# Patient Record
Sex: MALE | Race: WHITE | NOT HISPANIC OR LATINO | Employment: FULL TIME | URBAN - METROPOLITAN AREA
[De-identification: names, ages, dates, MRNs, and addresses within clinical notes are randomized per-mention and may not be internally consistent; named-entity substitution may affect disease eponyms.]

---

## 2018-05-21 ENCOUNTER — OFFICE VISIT (OUTPATIENT)
Dept: ORTHOPEDIC SURGERY | Facility: CLINIC | Age: 48
End: 2018-05-21

## 2018-05-21 VITALS
WEIGHT: 257.28 LBS | DIASTOLIC BLOOD PRESSURE: 103 MMHG | HEIGHT: 74 IN | BODY MASS INDEX: 33.02 KG/M2 | SYSTOLIC BLOOD PRESSURE: 169 MMHG | HEART RATE: 55 BPM

## 2018-05-21 DIAGNOSIS — M17.11 PRIMARY OSTEOARTHRITIS OF RIGHT KNEE: Primary | ICD-10-CM

## 2018-05-21 DIAGNOSIS — Z96.642 STATUS POST TOTAL REPLACEMENT OF LEFT HIP: ICD-10-CM

## 2018-05-21 PROCEDURE — 20610 DRAIN/INJ JOINT/BURSA W/O US: CPT | Performed by: ORTHOPAEDIC SURGERY

## 2018-05-21 PROCEDURE — 99214 OFFICE O/P EST MOD 30 MIN: CPT | Performed by: ORTHOPAEDIC SURGERY

## 2018-05-21 RX ORDER — TRIAMCINOLONE ACETONIDE 40 MG/ML
40 INJECTION, SUSPENSION INTRA-ARTICULAR; INTRAMUSCULAR
Status: COMPLETED | OUTPATIENT
Start: 2018-05-21 | End: 2018-05-21

## 2018-05-21 RX ORDER — ROPIVACAINE HYDROCHLORIDE 5 MG/ML
4 INJECTION, SOLUTION EPIDURAL; INFILTRATION; PERINEURAL
Status: COMPLETED | OUTPATIENT
Start: 2018-05-21 | End: 2018-05-21

## 2018-05-21 RX ADMIN — ROPIVACAINE HYDROCHLORIDE 4 ML: 5 INJECTION, SOLUTION EPIDURAL; INFILTRATION; PERINEURAL at 09:41

## 2018-05-21 RX ADMIN — TRIAMCINOLONE ACETONIDE 40 MG: 40 INJECTION, SUSPENSION INTRA-ARTICULAR; INTRAMUSCULAR at 09:41

## 2018-05-21 NOTE — PROGRESS NOTES
[facility] [place] [facility]opa[facility] Note    Subjective     Chief Complaint   Patient presents with   • Right Knee - Follow-up, Pain     (R) Knee Arthroscopy with Partial Medial and Lateral Meniscectomies 01/05/2017   • Left Hip - Follow-up     Left total hip arthroplasty, 2012        HPI    Silverio Ford is a 47 y.o. male. He presents today for evaluation of right knee pain.  He's had persistent pain in the right knee, which is mild to moderate, sharp in quality, associated with swelling, grinding and stiffness.  It worsens with walking, standing and sitting.  Known history of arthritis in the knee.  Previous arthroscopy with no significant relief.    Also, he would like to have his left hip followed up on.  He is now over 5 years out from his total hip arthroplasty, and has had 80% relief compared to his preoperative symptoms.  He does experience groin pain with certain activities.  No fevers, chills or night sweats.    There is no problem list on file for this patient.    No past medical history on file.   No past surgical history on file.   No family history on file.  Social History     Social History   • Marital status: Unknown     Spouse name: N/A   • Number of children: N/A   • Years of education: N/A     Occupational History   • Not on file.     Social History Main Topics   • Smoking status: Not on file   • Smokeless tobacco: Not on file   • Alcohol use Not on file   • Drug use: Unknown   • Sexual activity: Not on file     Other Topics Concern   • Not on file     Social History Narrative   • No narrative on file      No current outpatient prescriptions on file prior to visit.     No current facility-administered medications on file prior to visit.       No Known Allergies     Review of Systems   Constitutional: Negative for activity change, appetite change, chills, diaphoresis, fatigue, fever and unexpected weight change.   HENT: Negative for congestion, dental problem, drooling, ear discharge, ear pain,  "facial swelling, hearing loss, mouth sores, nosebleeds, postnasal drip, rhinorrhea, sinus pressure, sneezing, sore throat, tinnitus, trouble swallowing and voice change.    Eyes: Negative for photophobia, pain, discharge, redness, itching and visual disturbance.   Respiratory: Negative for apnea, cough, choking, chest tightness, shortness of breath, wheezing and stridor.    Cardiovascular: Negative for chest pain, palpitations and leg swelling.   Gastrointestinal: Negative for abdominal distention, abdominal pain, anal bleeding, blood in stool, constipation, diarrhea, nausea, rectal pain and vomiting.   Endocrine: Negative for cold intolerance, heat intolerance, polydipsia, polyphagia and polyuria.   Genitourinary: Negative for decreased urine volume, difficulty urinating, dysuria, enuresis, flank pain, frequency, genital sores, hematuria and urgency.   Musculoskeletal: Positive for joint swelling. Negative for arthralgias, back pain, gait problem, myalgias, neck pain and neck stiffness.        Joint Pain    Skin: Negative for color change, pallor, rash and wound.   Allergic/Immunologic: Negative for environmental allergies, food allergies and immunocompromised state.   Neurological: Negative for dizziness, tremors, seizures, syncope, facial asymmetry, speech difficulty, weakness, light-headedness, numbness and headaches.   Hematological: Negative for adenopathy. Does not bruise/bleed easily.   Psychiatric/Behavioral: Negative for agitation, behavioral problems, confusion, decreased concentration, dysphoric mood, hallucinations, self-injury, sleep disturbance and suicidal ideas. The patient is not nervous/anxious and is not hyperactive.         Objective      Physical Exam  BP (!) 169/103   Pulse 55   Ht 189 cm (74.41\")   Wt 117 kg (257 lb 4.4 oz)   BMI 32.67 kg/m²     Body mass index is 32.67 kg/m².    General:   Mental Status:  Alert   Appearance: Cooperative, in no acute distress   Build and Nutrition: " Well-nourished and well developed male   Orientation: Alert and oriented to person, place and time   Posture: Normal   Gait: Normal    Integument:   Left hip: Wound is well-healed with no signs of infection    Lower Extremity:   Left Hip:    Tenderness:  None    Swelling:  None    Crepitus:  None    Range of motion:  External Rotation: 30°       Internal Rotation: 30°       Flexion:  100°       Extension:  0°    Deformities:  None  Functional testing: Positive Stinchfield (mild)    No leg length discrepancy    Integument:   Right knee: No skin lesions, no rash, no ecchymosis    Lower Extremities:   Right Knee:    Tenderness:  No medial or lateral joint line tenderness    Effusion:  None    Swelling: None    Crepitus:  Positive    Range of motion:  Extension: 0°       Flexion: 120°  Instability:  No varus laxity, no valgus laxity, negative anterior drawer  Deformities:  Varus        Imaging/Studies  Imaging Results (last 24 hours)     Procedure Component Value Units Date/Time    XR Pelvis 1 or 2 View [[id number]] Resulted:  05/21/18 0952     Updated:  05/21/18 0953    Narrative:       Left Hip Radiographs  Indication: status-post left total hip arthroplasty  Views: low AP pelvis and lateral of the left hip    Comparison: no change compared to prior study, 11/6/2012    Findings:   The components are well aligned, with no signs of loosening or failure.    Mild amount of heterotopic bone is seen, around the greater trochanter,   certainly nonbridging.    XR Knee 4+ View Right [[id number]] Resulted:  05/21/18 0926     Updated:  05/21/18 0927    Narrative:       Right Knee Radiographs  Indication: right knee pain  Views: Standing AP's and skiers of both knees, with lateral and sunrise   views of the right knee    Comparison: no prior studies available    Findings:   Arthritic changes are seen, with varus alignment, medial joint space   narrowing, tricompartmental osteophytes, and bone-on-bone contact in the   patellofemoral  joint.            Assessment and Plan     Silverio was seen today for follow-up, pain and follow-up.    Diagnoses and all orders for this visit:    Primary osteoarthritis of right knee  -     XR Knee 4+ View Right  -     Large Joint Arthrocentesis    Status post total replacement of left hip  -     XR Pelvis 1 or 2 View          I reviewed my findings with patient today.  Right knee continues to bother him, and he would like to have an injection today.  Injection did provide 100% relief just a few minutes following the injection.  Long-term, he is candidate for knee replacement surgery.    With regards to his left hip, I see no unusual features radiographically.  Overall he is improved compared to his preoperative symptoms.  If he continues to have pain in the hip, we could work it up further with screening labs on his next visit.    Return in about 3 months (around 8/21/2018).      Medical Decision Making  Management Options : prescription/IM medicine  Data/Risk: radiology tests and independent visualization of imaging, lab tests, or EMG/NCV      Jarek Reid MD  05/21/18  10:08 AM

## 2018-05-21 NOTE — PROGRESS NOTES
Procedure   Large Joint Injection  Date/Time: 5/21/2018 9:41 AM  Consent given by: patient  Site marked: site marked  Timeout: Immediately prior to procedure a time out was called to verify the correct patient, procedure, equipment, support staff and site/side marked as required   Supporting Documentation  Indications: pain   Procedure Details  Location: knee - R knee  Preparation: Patient was prepped and draped in the usual sterile fashion  Needle size: 22 G  Approach: anterolateral  Medications administered: 4 mL ropivacaine 0.5 %; 40 mg triamcinolone acetonide 40 MG/ML  Patient tolerance: patient tolerated the procedure well with no immediate complications

## 2019-03-20 ENCOUNTER — OFFICE VISIT (OUTPATIENT)
Dept: ORTHOPEDIC SURGERY | Facility: CLINIC | Age: 49
End: 2019-03-20

## 2019-03-20 VITALS — BODY MASS INDEX: 34.8 KG/M2 | HEIGHT: 74 IN | WEIGHT: 271.17 LBS | OXYGEN SATURATION: 98 % | HEART RATE: 68 BPM

## 2019-03-20 DIAGNOSIS — M17.11 PRIMARY OSTEOARTHRITIS OF RIGHT KNEE: Primary | ICD-10-CM

## 2019-03-20 PROCEDURE — 20610 DRAIN/INJ JOINT/BURSA W/O US: CPT | Performed by: ORTHOPAEDIC SURGERY

## 2019-03-20 RX ORDER — TRIAMCINOLONE ACETONIDE 40 MG/ML
40 INJECTION, SUSPENSION INTRA-ARTICULAR; INTRAMUSCULAR
Status: COMPLETED | OUTPATIENT
Start: 2019-03-20 | End: 2019-03-20

## 2019-03-20 RX ORDER — ROPIVACAINE HYDROCHLORIDE 5 MG/ML
4 INJECTION, SOLUTION EPIDURAL; INFILTRATION; PERINEURAL
Status: COMPLETED | OUTPATIENT
Start: 2019-03-20 | End: 2019-03-20

## 2019-03-20 RX ADMIN — TRIAMCINOLONE ACETONIDE 40 MG: 40 INJECTION, SUSPENSION INTRA-ARTICULAR; INTRAMUSCULAR at 12:16

## 2019-03-20 RX ADMIN — ROPIVACAINE HYDROCHLORIDE 4 ML: 5 INJECTION, SOLUTION EPIDURAL; INFILTRATION; PERINEURAL at 12:16

## 2019-03-20 NOTE — PROGRESS NOTES
Procedure   Large Joint Arthrocentesis: R knee  Date/Time: 3/20/2019 12:16 PM  Consent given by: patient  Site marked: site marked  Timeout: Immediately prior to procedure a time out was called to verify the correct patient, procedure, equipment, support staff and site/side marked as required   Supporting Documentation  Indications: pain   Procedure Details  Location: knee - R knee  Preparation: Patient was prepped and draped in the usual sterile fashion  Needle size: 22 G  Approach: anterolateral  Medications administered: 4 mL ropivacaine 0.5 %; 40 mg triamcinolone acetonide 40 MG/ML  Patient tolerance: patient tolerated the procedure well with no immediate complications

## 2019-03-20 NOTE — PROGRESS NOTES
[facility] [place] [facility] Note    Subjective     Chief Complaint   Patient presents with   • Right Knee - Follow-up     10 months- Primary osteoarthritis of right knee         HPI    Silverio Ford is a 48 y.o. male.  He presents today for evaluation of right knee pain.  The pain is been present for 2 years, following no injury.  He had an injection last May, which did provide good relief, and he would like to have another injection today.  The pain is worse with walking, standing, and sitting.  It is associated with swelling, stiffness and giving way.  The pain is currently 6 out of 10.  It is stabbing in nature.      There is no problem list on file for this patient.    History reviewed. No pertinent past medical history.   Past Surgical History:   Procedure Laterality Date   • JOINT REPLACEMENT Left     2012   • KNEE ARTHROSCOPY Right     malcom   • KNEE ARTHROSCOPY Left     2001      Family History   Problem Relation Age of Onset   • No Known Problems Mother    • No Known Problems Father      Social History     Socioeconomic History   • Marital status: Unknown     Spouse name: Not on file   • Number of children: Not on file   • Years of education: Not on file   • Highest education level: Not on file   Tobacco Use   • Smoking status: Never Smoker   • Smokeless tobacco: Never Used   Substance and Sexual Activity   • Alcohol use: Yes     Comment: occ   • Drug use: No   • Sexual activity: Defer      No current outpatient medications on file prior to visit.     No current facility-administered medications on file prior to visit.       No Known Allergies     Review of Systems   Constitutional: Negative for activity change, appetite change, chills, diaphoresis, fatigue, fever and unexpected weight change.   HENT: Negative for congestion, dental problem, drooling, ear discharge, ear pain, facial swelling, hearing loss, mouth sores, nosebleeds, postnasal drip, rhinorrhea, sinus pressure, sneezing, sore throat,  "tinnitus, trouble swallowing and voice change.    Eyes: Negative for photophobia, pain, discharge, redness, itching and visual disturbance.   Respiratory: Negative for apnea, cough, choking, chest tightness, shortness of breath, wheezing and stridor.    Cardiovascular: Negative for chest pain, palpitations and leg swelling.   Gastrointestinal: Negative for abdominal distention, abdominal pain, anal bleeding, blood in stool, constipation, diarrhea, nausea, rectal pain and vomiting.   Endocrine: Negative for cold intolerance, heat intolerance, polydipsia, polyphagia and polyuria.   Genitourinary: Negative for decreased urine volume, difficulty urinating, dysuria, enuresis, flank pain, frequency, genital sores, hematuria and urgency.   Musculoskeletal: Positive for arthralgias and joint swelling. Negative for back pain, gait problem, myalgias, neck pain and neck stiffness.   Skin: Negative for color change, pallor, rash and wound.   Allergic/Immunologic: Negative for environmental allergies, food allergies and immunocompromised state.   Neurological: Negative for dizziness, tremors, seizures, syncope, facial asymmetry, speech difficulty, weakness, light-headedness, numbness and headaches.   Hematological: Negative for adenopathy. Does not bruise/bleed easily.   Psychiatric/Behavioral: Negative for agitation, behavioral problems, confusion, decreased concentration, dysphoric mood, hallucinations, self-injury, sleep disturbance and suicidal ideas. The patient is not nervous/anxious and is not hyperactive.         Objective      Physical Exam  Pulse 68   Ht 189 cm (74.41\")   Wt 123 kg (271 lb 2.7 oz)   SpO2 98%   BMI 34.43 kg/m²     Body mass index is 34.43 kg/m².    General:   Mental Status:  Alert   Appearance: Cooperative, in no acute distress   Build and Nutrition: Well-nourished well-developed male   Orientation: Alert and oriented to person, place and time   Posture: Normal   Gait: Normal    Integument:   Right " knee: No skin lesions, no rash, no ecchymosis    Lower Extremities:   Right Knee:    Tenderness:  None    Effusion:  None    Swelling: None    Crepitus:  Positive    Range of motion:  Extension: 0°       Flexion: 120°  Instability:  No varus laxity, no valgus laxity, negative anterior drawer  Deformities:  None          Assessment and Plan     Silverio was seen today for follow-up.    Diagnoses and all orders for this visit:    Primary osteoarthritis of right knee  -     Large Joint Arthrocentesis: R knee        1. Primary osteoarthritis of right knee        I reviewed my findings with the patient today.  He would like to have his right knee injected, and this was provided.  He had 80% relief just a few minutes on the injection.  I will see him back in 4 months, but sooner for any problems.    Return in about 4 months (around 7/20/2019).      Medical Decision Making  Management Options : prescription/IM medicine      Jarek Reid MD  03/20/19  5:32 PM

## 2020-06-19 DIAGNOSIS — Z12.11 SCREENING FOR COLON CANCER: Primary | ICD-10-CM

## 2020-06-26 ENCOUNTER — APPOINTMENT (OUTPATIENT)
Dept: PREADMISSION TESTING | Facility: HOSPITAL | Age: 50
End: 2020-06-26

## 2020-06-26 LAB
REF LAB TEST METHOD: NORMAL
SARS-COV-2 RNA RESP QL NAA+PROBE: NOT DETECTED

## 2020-06-26 PROCEDURE — U0004 COV-19 TEST NON-CDC HGH THRU: HCPCS

## 2020-06-26 PROCEDURE — C9803 HOPD COVID-19 SPEC COLLECT: HCPCS

## 2020-06-26 PROCEDURE — U0002 COVID-19 LAB TEST NON-CDC: HCPCS

## 2020-06-29 ENCOUNTER — OUTSIDE FACILITY SERVICE (OUTPATIENT)
Dept: GASTROENTEROLOGY | Facility: CLINIC | Age: 50
End: 2020-06-29

## 2020-06-29 ENCOUNTER — LAB REQUISITION (OUTPATIENT)
Dept: LAB | Facility: HOSPITAL | Age: 50
End: 2020-06-29

## 2020-06-29 DIAGNOSIS — Z12.11 ENCOUNTER FOR SCREENING FOR MALIGNANT NEOPLASM OF COLON: ICD-10-CM

## 2020-06-29 PROCEDURE — 45380 COLONOSCOPY AND BIOPSY: CPT | Performed by: INTERNAL MEDICINE

## 2020-06-29 PROCEDURE — 88305 TISSUE EXAM BY PATHOLOGIST: CPT | Performed by: INTERNAL MEDICINE

## 2020-06-30 LAB
CYTO UR: NORMAL
LAB AP CASE REPORT: NORMAL
LAB AP CLINICAL INFORMATION: NORMAL
PATH REPORT.FINAL DX SPEC: NORMAL
PATH REPORT.GROSS SPEC: NORMAL

## 2020-12-16 ENCOUNTER — WEB ENCOUNTER (OUTPATIENT)
Dept: URBAN - METROPOLITAN AREA CLINIC 82 | Facility: CLINIC | Age: 50
End: 2020-12-16

## 2020-12-18 ENCOUNTER — OFFICE VISIT (OUTPATIENT)
Dept: URBAN - METROPOLITAN AREA SURGERY CENTER 13 | Facility: SURGERY CENTER | Age: 50
End: 2020-12-18
Payer: COMMERCIAL

## 2020-12-18 DIAGNOSIS — Z12.11 COLON CANCER SCREENING: ICD-10-CM

## 2020-12-18 PROCEDURE — G8907 PT DOC NO EVENTS ON DISCHARG: HCPCS | Performed by: INTERNAL MEDICINE

## 2020-12-18 PROCEDURE — G0121 COLON CA SCRN NOT HI RSK IND: HCPCS | Performed by: INTERNAL MEDICINE

## 2020-12-18 PROCEDURE — G9935 CANC NOT DETECTD DURING SRCN: HCPCS | Performed by: INTERNAL MEDICINE

## 2023-09-06 ENCOUNTER — OFFICE VISIT (OUTPATIENT)
Dept: ORTHOPEDIC SURGERY | Facility: CLINIC | Age: 53
End: 2023-09-06
Payer: COMMERCIAL

## 2023-09-06 VITALS
BODY MASS INDEX: 34.24 KG/M2 | DIASTOLIC BLOOD PRESSURE: 90 MMHG | WEIGHT: 266.8 LBS | HEIGHT: 74 IN | SYSTOLIC BLOOD PRESSURE: 140 MMHG

## 2023-09-06 DIAGNOSIS — M17.11 PRIMARY OSTEOARTHRITIS OF RIGHT KNEE: Primary | ICD-10-CM

## 2023-09-06 RX ORDER — TRIAMCINOLONE ACETONIDE 40 MG/ML
40 INJECTION, SUSPENSION INTRA-ARTICULAR; INTRAMUSCULAR
Status: COMPLETED | OUTPATIENT
Start: 2023-09-06 | End: 2023-09-06

## 2023-09-06 RX ORDER — MELATONIN
2000 DAILY
COMMUNITY

## 2023-09-06 RX ORDER — MULTIPLE VITAMINS W/ MINERALS TAB 9MG-400MCG
1 TAB ORAL DAILY
COMMUNITY

## 2023-09-06 RX ORDER — ROPIVACAINE HYDROCHLORIDE 5 MG/ML
4 INJECTION, SOLUTION EPIDURAL; INFILTRATION; PERINEURAL
Status: COMPLETED | OUTPATIENT
Start: 2023-09-06 | End: 2023-09-06

## 2023-09-06 RX ADMIN — ROPIVACAINE HYDROCHLORIDE 4 ML: 5 INJECTION, SOLUTION EPIDURAL; INFILTRATION; PERINEURAL at 10:01

## 2023-09-06 RX ADMIN — TRIAMCINOLONE ACETONIDE 40 MG: 40 INJECTION, SUSPENSION INTRA-ARTICULAR; INTRAMUSCULAR at 10:01

## 2023-09-06 NOTE — PROGRESS NOTES
Procedure   - Large Joint Arthrocentesis: R knee on 9/6/2023 10:01 AM  Indications: pain  Details: 21 G needle, anterolateral approach  Medications: 4 mL ropivacaine 0.5 %; 40 mg triamcinolone acetonide 40 MG/ML  Outcome: tolerated well, no immediate complications  Consent was given by the patient. Immediately prior to procedure a time out was called to verify the correct patient, procedure, equipment, support staff and site/side marked as required. Patient was prepped and draped in the usual sterile fashion.

## 2023-09-06 NOTE — PROGRESS NOTES
[facility] [place] Orthopa[facility] Surgery Clinic Note    Subjective     Chief Complaint   Patient presents with    Right Knee - Pain        HPI    Silverio Ford is a 53 y.o. male who presents with new problem of: right knee pain.  Onset: atraumatic and gradual in nature. The issue has been ongoing for 6 year(s). Pain is a 5/10 on the pain scale. Pain is described as aching, stabbing, and shooting. Associated symptoms include pain, swelling, popping, and giving way/buckling. The pain is worse with walking, standing, sitting, climbing stairs, and sleeping; ice and rubbing it/moving it improves the pain. Previous treatments have included: steroid injection. Arthroscopy - 01/2017  (last injection 3/20/2019).  His right hip does bother him off and on also, but the knee seems to be the primary issue currently.    I have reviewed the following portions of the patient's history and agree with: History of Present Illness and Review of Systems    There is no problem list on file for this patient.    No past medical history on file.   Past Surgical History:   Procedure Laterality Date    JOINT REPLACEMENT Left     2012    KNEE ARTHROSCOPY Right     malcom    KNEE ARTHROSCOPY Left     2001      Family History   Problem Relation Age of Onset    No Known Problems Mother     No Known Problems Father      Social History     Socioeconomic History    Marital status:    Tobacco Use    Smoking status: Never    Smokeless tobacco: Never   Substance and Sexual Activity    Alcohol use: Yes     Comment: occ    Drug use: No    Sexual activity: Yes     Partners: Female      Current Outpatient Medications on File Prior to Visit   Medication Sig Dispense Refill    Cholecalciferol 25 MCG (1000 UT) tablet Take 2 tablets by mouth Daily.      multivitamin with minerals tablet tablet Take 1 tablet by mouth Daily.      Omega-3 Fatty Acids (OMEGA-3 CF PO) Take  by mouth.      [DISCONTINUED] Sod Picosulfate-Mag Ox-Cit Acd 10-3.5-12 MG-GM -GM/160ML solution Take  1 kit by mouth Take As Directed. Follow instructions that were mailed to your home. If you didn't receive these call [phone]. 2 bottle 0     No current facility-administered medications on file prior to visit.      Allergies   Allergen Reactions    Lovenox [Enoxaparin] Rash        Review of Systems   Constitutional:  Negative for activity change, appetite change, chills, diaphoresis, fatigue, fever and unexpected weight change.   HENT:  Negative for congestion, dental problem, drooling, ear discharge, ear pain, facial swelling, hearing loss, mouth sores, nosebleeds, postnasal drip, rhinorrhea, sinus pressure, sneezing, sore throat, tinnitus, trouble swallowing and voice change.    Eyes:  Negative for photophobia, pain, discharge, redness, itching and visual disturbance.   Respiratory:  Negative for apnea, cough, choking, chest tightness, shortness of breath, wheezing and stridor.    Cardiovascular:  Negative for chest pain, palpitations and leg swelling.   Gastrointestinal:  Negative for abdominal distention, abdominal pain, anal bleeding, blood in stool, constipation, diarrhea, nausea, rectal pain and vomiting.   Endocrine: Negative for cold intolerance, heat intolerance, polydipsia, polyphagia and polyuria.   Genitourinary:  Negative for decreased urine volume, difficulty urinating, dysuria, enuresis, flank pain, frequency, genital sores, hematuria and urgency.   Musculoskeletal:  Positive for arthralgias. Negative for back pain, gait problem, joint swelling, myalgias, neck pain and neck stiffness.   Skin:  Negative for color change, pallor, rash and wound.   Allergic/Immunologic: Negative for environmental allergies, food allergies and immunocompromised state.   Neurological:  Negative for dizziness, tremors, seizures, syncope, facial asymmetry, speech difficulty, weakness, light-headedness, numbness and headaches.   Hematological:  Negative for adenopathy. Does not bruise/bleed easily.  "  Psychiatric/Behavioral:  Negative for agitation, behavioral problems, confusion, decreased concentration, dysphoric mood, hallucinations, self-injury, sleep disturbance and suicidal ideas. The patient is not nervous/anxious and is not hyperactive.       Objective      Physical Exam  /90   Ht 188 cm (74\")   Wt 121 kg (266 lb 12.8 oz)   BMI 34.26 kg/m²     Body mass index is 34.26 kg/m².    General:   Mental Status:  Alert   Appearance: Cooperative, in no acute distress   Build and Nutrition: Well-nourished well-developed male   Orientation: Alert and oriented to person, place and time   Posture: Normal   Gait: Nonantalgic    Integument:   Right knee: No skin lesions, no rash, no ecchymosis    Lower Extremities:   Right Knee:    Tenderness:  Medial joint line tenderness    Effusion:  1+    Swelling: None    Crepitus:  Positive    Range of motion:  Extension: 0°       Flexion: 120°  Instability:  No varus laxity, no valgus laxity, negative anterior drawer  Deformities:  Varus      Imaging/Studies      Imaging Results (Last 24 Hours)       Procedure Component Value Units Date/Time    XR Knee 4+ View Right [[id number]] Resulted: 09/06/23 0946     Updated: 09/06/23 0946    Narrative:      Right Knee Radiographs  Indication: right knee pain  Views: Standing AP's and skiers of both knees, with lateral and sunrise   views of the right knee    Comparison: 5/21/2018    Findings:   Near bone-on-bone contact medial compartment, tricompartmental   osteophytes, peaking of the tibial spines, varus alignment, no acute bony   abnormalities.  Mild worsening overall compared to the previous imaging.              Assessment and Plan     Diagnoses and all orders for this visit:    1. Primary osteoarthritis of right knee (Primary)  -     XR Knee 4+ View Right  -     - Large Joint Arthrocentesis: R knee        1. Primary osteoarthritis of right knee        I reviewed my findings with the patient.  Discussed options for his knee " today, and he would like to proceed with an intra-articular knee injection.  If he has no significant improvement, his hip is likely contributing to the pain.  I will see him back in 3 months, but sooner for any problems.  Ultimately he is a candidate for knee replacement surgery in the future.    Procedure Note:  The potential benefits of performing a therapeutic right knee joint injection, as well as potential risks (including, but not limited to infection, swelling, pain, bleeding, bruising, nerve/blood vessel damage, skin color changes, transient elevation in blood glucose levels, and fat atrophy) were discussed with the patient.  After informed consent, timeout procedure was performed, and the skin on the right knee was prepped with chlorhexidine soap and alcohol, after which ethyl chloride was applied to the skin at the injection site. Via the anterolateral approach, 1ml of Kenalog 40mg/ml mixed with 4ml 0.5% ropivacaine plain was injected into the knee joint.  The patient tolerated the procedure well, experiencing 100% improvement a few minutes following the injection. There were no complications.  Band-Aid was applied to the injection site. Post-procedural instructions were given to the patient and/or their caregiver.      Return in about 3 months (around 12/6/2023).      Jarek Reid MD  09/06/23  10:09 EDT

## 2024-04-17 ENCOUNTER — OFFICE VISIT (OUTPATIENT)
Dept: ORTHOPEDIC SURGERY | Facility: CLINIC | Age: 54
End: 2024-04-17
Payer: COMMERCIAL

## 2024-04-17 VITALS
BODY MASS INDEX: 33.62 KG/M2 | WEIGHT: 262 LBS | HEIGHT: 74 IN | DIASTOLIC BLOOD PRESSURE: 100 MMHG | SYSTOLIC BLOOD PRESSURE: 150 MMHG

## 2024-04-17 DIAGNOSIS — M17.11 PRIMARY OSTEOARTHRITIS OF RIGHT KNEE: Primary | ICD-10-CM

## 2024-04-17 PROCEDURE — 99214 OFFICE O/P EST MOD 30 MIN: CPT | Performed by: ORTHOPAEDIC SURGERY

## 2024-04-17 RX ORDER — ACETAMINOPHEN 325 MG/1
1000 TABLET ORAL ONCE
OUTPATIENT
Start: 2024-04-17 | End: 2024-04-17

## 2024-04-17 RX ORDER — CHLORHEXIDINE GLUCONATE 40 MG/ML
SOLUTION TOPICAL DAILY
Qty: 237 ML | Refills: 0 | Status: SHIPPED | OUTPATIENT
Start: 2024-04-17

## 2024-04-17 RX ORDER — MELOXICAM 7.5 MG/1
15 TABLET ORAL ONCE
OUTPATIENT
Start: 2024-04-17 | End: 2024-04-17

## 2024-04-17 RX ORDER — PREGABALIN 150 MG/1
150 CAPSULE ORAL ONCE
OUTPATIENT
Start: 2024-04-17 | End: 2024-04-17

## 2024-04-17 NOTE — PROGRESS NOTES
[facility] [place]    Subjective     Chief Complaint   Patient presents with    Follow-up     7 month follow up-  osteoarthritis of right knee         HPI    It has been 7  month(s) since Mr. Ford's last visit. He returns to clinic today for follow-up of right knee arthritis. The issue has been ongoing for 6 year(s). He rates his pain a 9/10 on the pain scale. Previous/current treatments: NSAIDS. Current symptoms: pain and stiffness. The pain is worse with sitting, sleeping, and working. Overall, he is doing worse.  Pain is reached the point where he would like to proceed with knee replacement surgery.  No significant relief with the last injection.  He has known history of right hip arthritis, but has no groin pain.  No history of clots or clotting disorders.  No blood thinners.  His wife is able to help out postoperatively.  He is a candidate for outpatient surgery.    I have reviewed the following portions of the patient's history and agree with: History of Present Illness and Review of Systems    Patient Active Problem List   Diagnosis    Degenerative arthritis of right knee     Past Medical History:   Diagnosis Date    Ankle sprain     Bursitis of hip     Knee sprain     Knee swelling     Rotator cuff syndrome     Tear of meniscus of knee       Past Surgical History:   Procedure Laterality Date    HIP SURGERY      JOINT REPLACEMENT Left     2012    KNEE ARTHROSCOPY Right     malcom    KNEE ARTHROSCOPY Left     2001      Family History   Problem Relation Age of Onset    No Known Problems Mother     No Known Problems Father      Social History     Socioeconomic History    Marital status:    Tobacco Use    Smoking status: Never    Smokeless tobacco: Never   Vaping Use    Vaping status: Never Used   Substance and Sexual Activity    Alcohol use: Yes     Alcohol/week: 1.0 - 2.0 standard drink of alcohol     Types: 1 - 2 Cans of beer per week     Comment: occ    Drug use: No    Sexual  activity: Yes     Partners: Female      Current Outpatient Medications on File Prior to Visit   Medication Sig Dispense Refill    Cholecalciferol 25 MCG (1000 UT) tablet Take 2 tablets by mouth Daily.      multivitamin with minerals tablet tablet Take 1 tablet by mouth Daily.      Omega-3 Fatty Acids (OMEGA-3 CF PO) Take  by mouth.       No current facility-administered medications on file prior to visit.      Allergies   Allergen Reactions    Lovenox [Enoxaparin] Rash        Review of Systems   Constitutional:  Negative for activity change, appetite change, chills, diaphoresis, fatigue, fever and unexpected weight change.   HENT:  Negative for congestion, dental problem, drooling, ear discharge, ear pain, facial swelling, hearing loss, mouth sores, nosebleeds, postnasal drip, rhinorrhea, sinus pressure, sneezing, sore throat, tinnitus, trouble swallowing and voice change.    Eyes:  Negative for photophobia, pain, discharge, redness, itching and visual disturbance.   Respiratory:  Negative for apnea, cough, choking, chest tightness, shortness of breath, wheezing and stridor.    Cardiovascular:  Negative for chest pain, palpitations and leg swelling.   Gastrointestinal:  Negative for abdominal distention, abdominal pain, anal bleeding, blood in stool, constipation, diarrhea, nausea, rectal pain and vomiting.   Endocrine: Negative for cold intolerance, heat intolerance, polydipsia, polyphagia and polyuria.   Genitourinary:  Negative for decreased urine volume, difficulty urinating, dysuria, enuresis, flank pain, frequency, genital sores, hematuria and urgency.   Musculoskeletal:  Positive for arthralgias. Negative for back pain, gait problem, joint swelling, myalgias, neck pain and neck stiffness.   Skin:  Negative for color change, pallor, rash and wound.   Allergic/Immunologic: Negative for environmental allergies, food allergies and immunocompromised state.   Neurological:  Negative for dizziness, tremors,  "seizures, syncope, facial asymmetry, speech difficulty, weakness, light-headedness, numbness and headaches.   Hematological:  Negative for adenopathy. Does not bruise/bleed easily.   Psychiatric/Behavioral:  Negative for agitation, behavioral problems, confusion, decreased concentration, dysphoric mood, hallucinations, self-injury, sleep disturbance and suicidal ideas. The patient is not nervous/anxious and is not hyperactive.         Objective      Physical Exam  /100   Ht 188 cm (74.02\")   Wt 119 kg (262 lb)   BMI 33.62 kg/m²     Body mass index is 33.62 kg/m².    General:   Mental Status:  Alert   Appearance: Cooperative, in no acute distress   Build and Nutrition: Well-nourished well-developed male   Orientation: Alert and oriented to person, place and time   Posture: Normal   Gait: Mild limp on the right    Integument:              Right knee: No skin lesions, no rash, no ecchymosis     Lower Extremities:              Right Knee:                          Tenderness:    Medial joint line tenderness                          Effusion:          1+                          Swelling:None                          Crepitus:          Positive                          Range of motion:        Extension:       0°                                                              Flexion:           120°  Instability:        No varus laxity, no valgus laxity, negative anterior drawer  Deformities:     Varus    Imaging/Studies  Imaging Results (Last 24 Hours)       Procedure Component Value Units Date/Time    XR Knee 4+ View Right [[id number]] Resulted: 04/17/24 1203     Updated: 04/17/24 1203    Narrative:      Right Knee Radiographs  Indication: right knee pain  Views: Standing AP's and skiers of both knees, with lateral and sunrise   views of the right knee    Comparison: no prior studies available    Findings:    Near bone-on-bone contact medial compartment, tricompartmental   osteophytes, peaking of the tibial spines, " patellofemoral spurring, no   acute bony abnormalities.  No significant change compared to previous   imaging.  No unusual bony features.  Knee arthritis.                Assessment and Plan     Diagnoses and all orders for this visit:    1. Primary osteoarthritis of right knee (Primary)  -     XR Knee 4+ View Right  -     Case Request; Standing  -     Instructions on coughing, deep breathing, and incentive spirometry.; Future  -     CBC and Differential; Future  -     Basic metabolic panel; Future  -     Protime-INR; Future  -     APTT; Future  -     Hemoglobin A1c; Future  -     Sedimentation rate; Future  -     C-reactive protein; Future  -     Tranexamic Acid 1,000 mg in sodium chloride 0.9 % 100 mL  -     Tranexamic Acid 1,000 mg in sodium chloride 0.9 % 100 mL  -     ethyl alcohol 62 % 2 each  -     ceFAZolin (ANCEF) 2 g in sodium chloride 0.9 % 100 mL IVPB  -     acetaminophen (TYLENOL) tablet 975 mg  -     meloxicam (MOBIC) tablet 15 mg  -     pregabalin (LYRICA) capsule 150 mg  -     Case Request    Other orders  -     Outpatient In A Bed; Standing  -     Follow Anesthesia Guidelines / Protocol; Future  -     Follow Anesthesia Guidelines / Protocol; Standing  -     Nerve Block; Standing  -     Verify NPO Status; Standing  -     Verify The Time Patient Completed ERAS Hydration Drink; Standing  -     SCD (sequential compression device)- to be placed on patient in Pre-op; Standing  -     POC Glucose Once; Standing  -     Clip operative site; Standing  -     Obtain informed consent (if not collected inpatient or PAT); Standing  -     Obtain informed consent  -     Provide instructions to patient regarding NPO status  -     Chlorhexidine Skin Prep - Educate and Review With Patient; Future  -     Provide Patient With ERAS Hydration Instructions  -     Provide Patient With Enhanced Recovery Booklet(s) or Handout  -     Provide Instructions/Handout For Benzoyl Peroxide 5% Wash If Having Shoulder/Arm Surgery (If  Prescribed)  -     Provide Instructions/Handout For Bactroban And Chlorhexidine Shower (If Prescribed)  -     Perform A Memory Screening On All Hip/Knee Replacement Patients >Or Equal To 65 Years Or Older  -     Complete A PROMIS And HOOS Or KOOS Survey If Having Hip Or Knee Replacement  -     Provide Patient With Carbo Loading Instructions  -     Provide Patient With ERAS Booklet(s)/Handout  -     chlorhexidine (HIBICLENS) 4 % external liquid; Apply  topically to the appropriate area as directed Daily. Shower with hibiclens solution as directed for 5 days prior to surgery  Dispense: 236 mL; Refill: 0        1. Primary osteoarthritis of right knee        I reviewed my findings with the patient.  His right knee pain is progressed to the point where he would like to proceed with right total knee arthroplasty surgery.  He has exhausted conservative treatment.  Please see my counseling over details.  He would like to proceed in June timeframe.  Risks, benefits, alternatives have been discussed.    Surgical Counseling     I have informed the patient of the diagnosis and the prognosis.  Exhaustive conservative treatment modalities have not resulted in long term pain relief.  The symptoms have progressed to the point of daily pain and inability to perform activities of daily living without significant pain. The patient has reached the point of desiring to proceed with total knee arthroplasty after discussing the risks, benefits and alternatives to the procedure.  The surgical procedure itself was discussed in detail. Risks of the procedure were discussed, which included but are not limited to, bleeding, infection, damage to blood vessels and nerves, incomplete pain relief, loosening of the prosthesis (early or late), deep infection (early or late), need for further surgery, loss of limb, deep venous thrombosis, pulmonary embolus, death, heart attack, stroke, kidney failure, liver failure, and anesthetic complications.  In  addition, the potential for deep infection developing in the future was discussed, which could require further surgery.  The knee would have to be re-opened, debrided, and potentially remove the prosthesis, which may or may not be replaced in the future.  Also, the possibility for loosening of the prosthesis has been mentioned.  If the prosthesis loosened, a revision arthroplasty could be performed, with results that are not as predictable compared to the original procedure.  The typical rehabilitative course has also been discussed, and full recovery may take up to a year to see the maximum benefit.  The importance of patient cooperation in the rehabilitative efforts has also been discussed.  No guarantees were given.  The patient understands the potential risks versus the benefits and desires to proceed with total knee arthroplasty at a mutually convenient time.     Return for surgery.      Jarek Reid MD  04/17/24  12:13 EDT    Dictated Utilizing Dragon Dictation

## 2024-09-04 DIAGNOSIS — M17.11 PRIMARY OSTEOARTHRITIS OF RIGHT KNEE: Primary | ICD-10-CM

## 2024-09-05 ENCOUNTER — PRE-ADMISSION TESTING (OUTPATIENT)
Dept: PREADMISSION TESTING | Facility: HOSPITAL | Age: 54
End: 2024-09-05
Payer: COMMERCIAL

## 2024-09-05 DIAGNOSIS — M17.11 PRIMARY OSTEOARTHRITIS OF RIGHT KNEE: ICD-10-CM

## 2024-09-05 LAB
ANION GAP SERPL CALCULATED.3IONS-SCNC: 11 MMOL/L (ref 5–15)
APTT PPP: 31.9 SECONDS (ref 22–39)
BASOPHILS # BLD AUTO: 0.07 10*3/MM3 (ref 0–0.2)
BASOPHILS NFR BLD AUTO: 1 % (ref 0–1.5)
BUN SERPL-MCNC: 9 MG/DL (ref 6–20)
BUN/CREAT SERPL: 10.2 (ref 7–25)
CALCIUM SPEC-SCNC: 9.2 MG/DL (ref 8.6–10.5)
CHLORIDE SERPL-SCNC: 106 MMOL/L (ref 98–107)
CO2 SERPL-SCNC: 23 MMOL/L (ref 22–29)
CREAT SERPL-MCNC: 0.88 MG/DL (ref 0.76–1.27)
CRP SERPL-MCNC: <0.3 MG/DL (ref 0–0.5)
DEPRECATED RDW RBC AUTO: 41.4 FL (ref 37–54)
EGFRCR SERPLBLD CKD-EPI 2021: 100.9 ML/MIN/1.73
EOSINOPHIL # BLD AUTO: 0.22 10*3/MM3 (ref 0–0.4)
EOSINOPHIL NFR BLD AUTO: 3.1 % (ref 0.3–6.2)
ERYTHROCYTE [DISTWIDTH] IN BLOOD BY AUTOMATED COUNT: 12.7 % (ref 12.3–15.4)
ERYTHROCYTE [SEDIMENTATION RATE] IN BLOOD: 18 MM/HR (ref 0–20)
GLUCOSE SERPL-MCNC: 100 MG/DL (ref 65–99)
HBA1C MFR BLD: 5.1 % (ref 4.8–5.6)
HCT VFR BLD AUTO: 46.4 % (ref 37.5–51)
HGB BLD-MCNC: 15.4 G/DL (ref 13–17.7)
IMM GRANULOCYTES # BLD AUTO: 0.02 10*3/MM3 (ref 0–0.05)
IMM GRANULOCYTES NFR BLD AUTO: 0.3 % (ref 0–0.5)
INR PPP: 1.1 (ref 0.89–1.12)
LYMPHOCYTES # BLD AUTO: 2.23 10*3/MM3 (ref 0.7–3.1)
LYMPHOCYTES NFR BLD AUTO: 31.4 % (ref 19.6–45.3)
MCH RBC QN AUTO: 29.5 PG (ref 26.6–33)
MCHC RBC AUTO-ENTMCNC: 33.2 G/DL (ref 31.5–35.7)
MCV RBC AUTO: 88.9 FL (ref 79–97)
MONOCYTES # BLD AUTO: 0.9 10*3/MM3 (ref 0.1–0.9)
MONOCYTES NFR BLD AUTO: 12.7 % (ref 5–12)
NEUTROPHILS NFR BLD AUTO: 3.67 10*3/MM3 (ref 1.7–7)
NEUTROPHILS NFR BLD AUTO: 51.5 % (ref 42.7–76)
NRBC BLD AUTO-RTO: 0 /100 WBC (ref 0–0.2)
PLATELET # BLD AUTO: 386 10*3/MM3 (ref 140–450)
PMV BLD AUTO: 8.7 FL (ref 6–12)
POTASSIUM SERPL-SCNC: 3.9 MMOL/L (ref 3.5–5.2)
PROTHROMBIN TIME: 14.3 SECONDS (ref 12.2–14.5)
RBC # BLD AUTO: 5.22 10*6/MM3 (ref 4.14–5.8)
SODIUM SERPL-SCNC: 140 MMOL/L (ref 136–145)
WBC NRBC COR # BLD AUTO: 7.11 10*3/MM3 (ref 3.4–10.8)

## 2024-09-05 PROCEDURE — 36415 COLL VENOUS BLD VENIPUNCTURE: CPT

## 2024-09-05 PROCEDURE — 83036 HEMOGLOBIN GLYCOSYLATED A1C: CPT

## 2024-09-05 PROCEDURE — 86140 C-REACTIVE PROTEIN: CPT

## 2024-09-05 PROCEDURE — 80048 BASIC METABOLIC PNL TOTAL CA: CPT

## 2024-09-05 PROCEDURE — 85610 PROTHROMBIN TIME: CPT

## 2024-09-05 PROCEDURE — 85652 RBC SED RATE AUTOMATED: CPT

## 2024-09-05 PROCEDURE — 85025 COMPLETE CBC W/AUTO DIFF WBC: CPT

## 2024-09-05 PROCEDURE — 85730 THROMBOPLASTIN TIME PARTIAL: CPT

## 2024-09-05 NOTE — PAT
Patient to apply Chlorhexadine wipes  to surgical area (as instructed) the night before procedure and the AM of procedure. Wipes provided.    Patient denies any current skin issues.     An arrival time for procedure was not provided during PAT visit. If patient had any questions or concerns about their arrival time, they were instructed to contact their surgeon/physician.  Additionally, if the patient referred to an arrival time that was acquired from their my chart account, patient was encouraged to verify that time with their surgeon/physician. Arrival times are NOT provided in Pre Admission Testing Department.    Patient instructed to drink 20 ounces of Gatorade or Gatorlyte (if diabetic) and it needs to be completed 1 hour (for Main OR patients) or 2 hours (scheduled  section & [facility] patients) before given arrival time for procedure (NO RED Gatorade and NO Gatorade Zero).    Patient verbalized understanding.    Prescription for Chlorhexidine shower called into patient's pharmacy or [facility] pharmacy by patient's surgeon.  Reinforced with patient to  the prescription from applicable pharmacy if they haven't already.  Verbal and written instructions given regarding proper use of Chlorhexidine body wash to patient and/or famlily during PAT visit. Patient/family also instructed to complete checklist and return it to Pre-op on the day of surgery.  Patient and/or family verbalized understanding.

## 2024-09-06 ENCOUNTER — TELEPHONE (OUTPATIENT)
Dept: ORTHOPEDIC SURGERY | Facility: CLINIC | Age: 54
End: 2024-09-06
Payer: COMMERCIAL

## 2024-09-06 NOTE — TELEPHONE ENCOUNTER
LVM for pt to return call.      If pt calls back I need to know what date he is expected for jury duty.    Rosana Cardona CMA ([facility])

## 2024-09-06 NOTE — TELEPHONE ENCOUNTER
Pt returned call, states that jury duty began today    Asking for note starting the 12th and however long after the surgery he will need.

## 2024-09-06 NOTE — TELEPHONE ENCOUNTER
Patient called stating that he is needing a note to excuse him from jury duty due to him having surgery with Dr Reid on 9/12/24, patient wants it faxed to [phone] [facility]  Office    Please advise, thank you

## 2024-09-12 ENCOUNTER — DOCUMENTATION (OUTPATIENT)
Dept: ORTHOPEDIC SURGERY | Facility: CLINIC | Age: 54
End: 2024-09-12

## 2024-09-12 ENCOUNTER — OUTSIDE FACILITY SERVICE (OUTPATIENT)
Dept: ORTHOPEDIC SURGERY | Facility: CLINIC | Age: 54
End: 2024-09-12
Payer: COMMERCIAL

## 2024-09-12 RX ORDER — MELOXICAM 15 MG/1
15 TABLET ORAL DAILY PRN
Qty: 30 TABLET | Refills: 0 | Status: SHIPPED | OUTPATIENT
Start: 2024-09-12

## 2024-09-12 RX ORDER — OXYCODONE HYDROCHLORIDE 5 MG/1
5 TABLET ORAL EVERY 4 HOURS PRN
Qty: 40 TABLET | Refills: 0 | Status: SHIPPED | OUTPATIENT
Start: 2024-09-12

## 2024-09-12 NOTE — PROGRESS NOTES
[facility]    [place]ative Report    [facility]  [address] [place] [address]    DATE OF PROCEDURE: 09/12/24    PREOPERATIVE DIAGNOSIS: right knee arthritis    POSTOPERATIVE DIAGNOSIS:  right knee arthritis    PROCEDURES PERFORMED:   right total knee arthroplasty with [employer] components (# 7 cruciate retaining femur, # 6 tibia, 10 mm polyethylene, with 35 three peg patella) with CORI robotic assitance    Surgical Approach: Knee Medial Parapatellar    SURGEON: Jarek Reid MD    ASSISTANT: Alba Goel PA-C  (Alba Goel PA-C was present and necessary for positioning, draping, retraction, instrumentation and closure.)    SPECIMENS: None    ANESTHESIA: General    TOURNIQUET TIME: 17 minutes    ESTIMATED BLOOD LOSS: 100 cc    COMPLICATIONS: None    PREOPERATIVE ANTIBIOTICS: Ancef 3 g    INDICATIONS: The patient is a 54 y.o. male with debilitating right knee pain secondary to osteoarthritis that failed to improve in spite of conservative treatment .  Options have been discussed at length with the patient and the patient has had an extended course of conservative treatment without long-term benefit. The patient has reached the point where the patient desires total knee arthroplasty surgery and understands the risks, benefits, and alternatives. Consent was obtained. Please see my office notes for details with regard to preoperative counseling and operative rationale.     DESCRIPTION OF PROCEDURE: The patient was positively identified in the preoperative holding area and brought to the operating suite and placed in a supine position. After adequate general anesthetic had been achieved (failed attempt at spinal anesthetic), the right lower extremity was prepped and draped in the usual sterile fashion.  After application of a tourniquet to the right upper thigh, which was used during the procedure for a total 17 minutes during  the cementation process only. Landmarks of the knee were identified and timeout procedure was performed to confirm the operative site, as well as other parameters. Following the sterile prep and drape, a skin incision was made just off the medial aspect of midline for a medial parapatellar approach. Following a sharp skin incision, dissection was carried down to the level of the extensor mechanism and a medial parapatellar arthrotomy was made and the patella was tucked into the lateral gutter.  Anterior horns of the medial and lateral menisci were removed, and ACL transected.  Osteophytes were also removed.  Description of arthritis: Bone-on-bone contact medial compartment, tricompartmental osteophytes, varus alignment.      [employer] robotic system was then employed to assist with preparation of the femoral and tibial bone surfaces.  Femoral and tibial arrays were placed, as well as markers in both the femur and tibia.  System was registered in a systematic fashion, and 3D models created of both the femoral and tibial aspects.  Range of motion and gap assessments were also performed, and implants were selected and appropriately sized and oriented both the femur and tibial aspects starting with the flexion gap, and then progressing to the extension gap.  Planning was made for a 7 cruciate retaining femoral component and a 6 tibial component with a 9 mm insert.  Once balancing had been achieved, distal femoral surface was then prepared with the CORI shayy, followed by preparation for the 4-in-1 cutting block.  Proximal tibial cut was then performed in a guided fashion, posterior condyles were freed of osteophytes, and trial implants placed, namely a 7 cruciate retaining femur with a 6 tibia and a 10 mm trial insert.  Range of motion and gap assessments were again performed, and excellent balancing was noted.  The patella was then prepared for a 35 three peg patella which had excellent tracking.      Therefore the trial  components were removed, and preparations made for final placement, and final components were cemented in place with namely a # 6 tibia, # 7 cruciate retaining femur, and a 35 three peg patella with a trial 11 mm insert for cement compression. All the excess cement was removed from the bone implant interface and allowed to harden. Tourniquet was deflated. Hemostasis was obtained with electrocautery. There was no brisk bleeding noted in the popliteal fossa in particular. Therefore, the knee was copiously irrigated as it was between major steps, and the final 10 mm insert was placed as this was deemed appropriate for the patient's anatomy with full flexion and extension and no instability and attention was then directed towards closure. The medial parapatellar arthrotomy was closed with #1 Vicryl in an interrupted figure-of-eight fashion in 4 strategic locations followed by oversewing this from proximal to distal with a #1 StrataFix symmetric, which nicely sealed the joint, followed by closure of the deep fascial layer with #1 Vicryl in a buried interrupted fashion, followed by closure of the subcutaneous layer with 2-0 Vicryl and the skin with 3-0 Stratafix in a running subcuticular fashion.  Adhesive wound closure dressing was applied followed by a sterile dressing with 4 x 4's, abdominal pad, soft roll and Ace wrap. The patient tolerated the procedure well and was brought to the recovery room in good condition.     PLAN:  1.  The patient will begin early range of motion and weight-bearing per the post total knee arthroplasty protocol.   2.  The patient has planned for discharge today when stable medically with continued rehabilitation outpatient physical therapy setting.  Follow up in 3 weeks as planned in the office.  3.  Baby aspirin twice a day for a month will be utilized for DVT prophylaxis.    Future Appointments   Date Time Provider Department Center   10/2/2024 10:20 AM Alba Goel PA-C [facility]  [facility] Reid MD  09/12/24  12:36 EDT

## 2024-09-24 ENCOUNTER — TELEPHONE (OUTPATIENT)
Dept: ORTHOPEDIC SURGERY | Facility: CLINIC | Age: 54
End: 2024-09-24
Payer: COMMERCIAL

## 2024-09-25 ENCOUNTER — OFFICE VISIT (OUTPATIENT)
Dept: ORTHOPEDIC SURGERY | Facility: CLINIC | Age: 54
End: 2024-09-25
Payer: COMMERCIAL

## 2024-09-25 VITALS — TEMPERATURE: 97.1 F

## 2024-09-25 DIAGNOSIS — Z96.651 S/P TOTAL KNEE ARTHROPLASTY, RIGHT: Primary | ICD-10-CM

## 2024-09-25 PROCEDURE — 99024 POSTOP FOLLOW-UP VISIT: CPT | Performed by: PHYSICIAN ASSISTANT

## 2024-09-27 ENCOUNTER — TELEPHONE (OUTPATIENT)
Age: 54
End: 2024-09-27
Payer: COMMERCIAL

## 2024-11-06 ENCOUNTER — OFFICE VISIT (OUTPATIENT)
Dept: ORTHOPEDIC SURGERY | Facility: CLINIC | Age: 54
End: 2024-11-06
Payer: COMMERCIAL

## 2024-11-06 DIAGNOSIS — Z96.651 S/P TOTAL KNEE ARTHROPLASTY, RIGHT: Primary | ICD-10-CM

## 2024-11-06 DIAGNOSIS — Z47.89 ORTHOPEDIC AFTERCARE: ICD-10-CM

## 2024-11-06 PROCEDURE — 99024 POSTOP FOLLOW-UP VISIT: CPT | Performed by: ORTHOPAEDIC SURGERY

## 2024-11-06 NOTE — PROGRESS NOTES
[facility]    Subjective     Chief Complaint   Patient presents with    Post-op     6 week follow up--2 month S/P  right total knee arthroplasty with CORI robot (DOS: 9/12/24))             HPI    It has been 6  week(s) since Mr. Ford's last visit. He returns to clinic today for postoperative follow-up of right knee arthroplasty. The issue has been ongoing for 2 month(s). He rates his pain a 0/10 on the pain scale. Previous/current treatments: NSAIDS and physical therapy. Current symptoms: popping. The pain is worse with  none ; none improve the pain. Overall, he is doing better.  100% improvement compared to his preoperative symptoms.  Working through stiffness with therapy.      I have reviewed the following portions of the patient's history and agree with: History of Present Illness and Review of Systems    Patient Active Problem List   Diagnosis    Degenerative arthritis of right knee     Past Medical History:   Diagnosis Date    Ankle sprain     Bursitis of hip     Knee sprain     Knee swelling     Rotator cuff syndrome     Tear of meniscus of knee       Past Surgical History:   Procedure Laterality Date    HIP SURGERY      JOINT REPLACEMENT Left     2012    KNEE ARTHROSCOPY Right     malcom    KNEE ARTHROSCOPY Left     2001      Family History   Problem Relation Age of Onset    No Known Problems Mother     No Known Problems Father      Social History     Socioeconomic History    Marital status:    Tobacco Use    Smoking status: Never    Smokeless tobacco: Never   Vaping Use    Vaping status: Never Used   Substance and Sexual Activity    Alcohol use: Yes     Alcohol/week: 1.0 - 2.0 standard drink of alcohol     Types: 1 - 2 Cans of beer per week     Comment: occ    Drug use: No    Sexual activity: Yes     Partners: Female      Current Outpatient Medications on File Prior to Visit   Medication Sig Dispense Refill    Cholecalciferol 25 MCG (1000 UT) tablet Take 2 tablets by  mouth Daily.      multivitamin with minerals tablet tablet Take 1 tablet by mouth Daily.      Omega-3 Fatty Acids (OMEGA-3 CF PO) Take  by mouth.      meloxicam (MOBIC) 15 MG tablet Take 1 tablet by mouth Daily With Food As Needed. (Patient not taking: Reported on 11/6/2024) 30 tablet 0    oxyCODONE (ROXICODONE) 5 MG immediate release tablet Take 1 tablet by mouth Every 4 (Four) Hours As Needed for Moderate Pain. (Patient not taking: Reported on 11/6/2024) 40 tablet 0     No current facility-administered medications on file prior to visit.      Allergies   Allergen Reactions    Lovenox [Enoxaparin] Rash        Review of Systems   Constitutional:  Negative for activity change, appetite change, chills, diaphoresis, fatigue, fever and unexpected weight change.   HENT:  Negative for congestion, dental problem, drooling, ear discharge, ear pain, facial swelling, hearing loss, mouth sores, nosebleeds, postnasal drip, rhinorrhea, sinus pressure, sneezing, sore throat, tinnitus, trouble swallowing and voice change.    Eyes:  Negative for photophobia, pain, discharge, redness, itching and visual disturbance.   Respiratory:  Negative for apnea, cough, choking, chest tightness, shortness of breath, wheezing and stridor.    Cardiovascular:  Negative for chest pain, palpitations and leg swelling.   Gastrointestinal:  Negative for abdominal distention, abdominal pain, anal bleeding, blood in stool, constipation, diarrhea, nausea, rectal pain and vomiting.   Endocrine: Negative for cold intolerance, heat intolerance, polydipsia, polyphagia and polyuria.   Genitourinary:  Negative for decreased urine volume, difficulty urinating, dysuria, enuresis, flank pain, frequency, genital sores, hematuria and urgency.   Musculoskeletal:  Positive for arthralgias. Negative for back pain, gait problem, joint swelling, myalgias, neck pain and neck stiffness.   Skin:  Negative for color change, pallor, rash and wound.   Allergic/Immunologic:  Negative for environmental allergies, food allergies and immunocompromised state.   Neurological:  Negative for dizziness, tremors, seizures, syncope, facial asymmetry, speech difficulty, weakness, light-headedness, numbness and headaches.   Hematological:  Negative for adenopathy. Does not bruise/bleed easily.   Psychiatric/Behavioral:  Negative for agitation, behavioral problems, confusion, decreased concentration, dysphoric mood, hallucinations, self-injury, sleep disturbance and suicidal ideas. The patient is not nervous/anxious and is not hyperactive.         Objective      Physical Exam  There were no vitals taken for this visit.    There is no height or weight on file to calculate BMI.         General:   Mental Status:  Alert   Appearance: Cooperative, in no acute distress   Build and Nutrition: Well-nourished well-developed male   Orientation: Alert and oriented to person, place and time   Posture: Normal   Gait: Nonantalgic    Integument:   Right knee: Wound is well-healed with no signs of infection    Lower Extremities:   Right Knee:    Tenderness:  None    Effusion:  Trace    Swelling: None    Crepitus:  None    Range of motion:  Extension: 5°       Flexion: 125°  Instability:  No varus laxity, no valgus laxity, negative anterior drawer  Deformities:  None      Imaging/Studies  Imaging Results (Last 24 Hours)       ** No results found for the last 24 hours. **          No new imaging today.    Assessment and Plan     Diagnoses and all orders for this visit:    1. S/P total knee arthroplasty, right (Primary)    2. Orthopedic aftercare        1. S/P total knee arthroplasty, right    2. Orthopedic aftercare          Reviewed my findings with the patient.  His right total knee arthroplasty is functioning well, he is going to continue to work on extension, and I will see him back in 4 months for checkup.  I will see him back sooner for any problems.    Return in about 4 months (around 3/6/2025) for recheck  with x-rays.      Jarek Reid MD  11/06/24  12:00 EST    Dictated Utilizing Dragon Dictation

## 2025-01-31 ENCOUNTER — TELEPHONE (OUTPATIENT)
Dept: ORTHOPEDIC SURGERY | Facility: CLINIC | Age: 55
End: 2025-01-31
Payer: COMMERCIAL

## 2025-01-31 NOTE — TELEPHONE ENCOUNTER
Caller: Silverio Ford / PATIENT     Best call back number: [phone]     Requested Prescriptions:   ANTIBIOTIC PREMED?      Pharmacy where request should be sent:    [facility] # [id number] (PH: [phone])    Last office visit with prescribing clinician: 11/6/2024     Next office visit with prescribing clinician: 3/10/2025     Additional details provided by patient: PATIENT HAD RIGHT TOTAL KNEE REPLACEMENT BY DR FARFAN 09-12-24     CURRENTLY HAS DERMATOLOGIST APPT MON 02-03-25 EXPECTING TO HAVE SOME AREAS OF HIS SKIN INCISED / BIOPSIED - ASKING IF OK TO KEEP APPT / HAVE DONE POST RIGHT KNEE REPLACEMENT?     AND IF SO, IF HE WILL NEED ANTIBIOTIC PREMED LIKE WITH DENTAL APPTS?     NO KNOWN ANTIBIOTIC ALLERGIES     Does the patient have less than a 3 day supply:  [x] No (FIRST SCRIPT)     Would you like a call back once the refill request has been completed: [x] Yes - CAN LEAVE VMAIL     THANKS

## 2025-01-31 NOTE — TELEPHONE ENCOUNTER
LVM that he does not need antibiotics before his dermatology appointment. Des [facility] to give us a call back with any questions or concerns.     Rosana Cardona CMA ([facility])

## 2025-03-10 ENCOUNTER — OFFICE VISIT (OUTPATIENT)
Dept: ORTHOPEDIC SURGERY | Facility: CLINIC | Age: 55
End: 2025-03-10
Payer: COMMERCIAL

## 2025-03-10 VITALS
HEIGHT: 74 IN | SYSTOLIC BLOOD PRESSURE: 148 MMHG | DIASTOLIC BLOOD PRESSURE: 92 MMHG | WEIGHT: 259 LBS | BODY MASS INDEX: 33.24 KG/M2

## 2025-03-10 DIAGNOSIS — Z96.651 S/P TOTAL KNEE ARTHROPLASTY, RIGHT: Primary | ICD-10-CM

## 2025-03-10 DIAGNOSIS — Z09 POSTOPERATIVE EXAMINATION: ICD-10-CM

## 2025-03-10 NOTE — PROGRESS NOTES
[facility] [place] [facility] Note    Subjective     Chief Complaint   Patient presents with    Follow-up     4 month recheck- 6 months S/P  right total knee arthroplasty with CORI robot (DOS: 9/12/24)        HPI    It has been 4  month(s) since Mr. Ford's last visit. He returns to clinic today for postoperative follow-up of right knee arthroplasty. The issue has been ongoing for 6 month(s). He rates his pain a 1/10 on the pain scale. Previous/current treatments: cane/walker, NSAIDS, and physical therapy. Current symptoms: swelling, popping, grinding, and stiffness. The pain is worse with sitting; ice improve the pain. Overall, he is doing better.  90+ percent improvement compared to his preoperative symptoms.  Fully ambulatory without external aids.      I have reviewed the following portions of the patient's history and agree with: History of Present Illness and Review of Systems    Patient Active Problem List   Diagnosis    Degenerative arthritis of right knee     Past Medical History:   Diagnosis Date    Ankle sprain     Bursitis of hip     Knee sprain     Knee swelling     Rotator cuff syndrome     Tear of meniscus of knee       Past Surgical History:   Procedure Laterality Date    HIP SURGERY      JOINT REPLACEMENT Left     2012    KNEE ARTHROSCOPY Right     malcom    KNEE ARTHROSCOPY Left     2001      Family History   Problem Relation Age of Onset    No Known Problems Mother     No Known Problems Father      Social History     Socioeconomic History    Marital status:    Tobacco Use    Smoking status: Never    Smokeless tobacco: Never   Vaping Use    Vaping status: Never Used   Substance and Sexual Activity    Alcohol use: Yes     Alcohol/week: 1.0 - 2.0 standard drink of alcohol     Types: 1 - 2 Cans of beer per week     Comment: occ    Drug use: No    Sexual activity: Yes     Partners: Female      Current Outpatient Medications on File Prior to Visit   Medication Sig Dispense Refill     Cholecalciferol 25 MCG (1000 UT) tablet Take 2 tablets by mouth Daily.      meloxicam (MOBIC) 15 MG tablet Take 1 tablet by mouth Daily With Food As Needed. (Patient not taking: Reported on 11/6/2024) 30 tablet 0    multivitamin with minerals tablet tablet Take 1 tablet by mouth Daily.      Omega-3 Fatty Acids (OMEGA-3 CF PO) Take  by mouth.      oxyCODONE (ROXICODONE) 5 MG immediate release tablet Take 1 tablet by mouth Every 4 (Four) Hours As Needed for Moderate Pain. (Patient not taking: Reported on 11/6/2024) 40 tablet 0     No current facility-administered medications on file prior to visit.      Allergies   Allergen Reactions    Lovenox [Enoxaparin] Rash        Review of Systems   Constitutional:  Negative for activity change, appetite change, chills, diaphoresis, fatigue, fever and unexpected weight change.   HENT:  Negative for congestion, dental problem, drooling, ear discharge, ear pain, facial swelling, hearing loss, mouth sores, nosebleeds, postnasal drip, rhinorrhea, sinus pressure, sneezing, sore throat, tinnitus, trouble swallowing and voice change.    Eyes:  Negative for photophobia, pain, discharge, redness, itching and visual disturbance.   Respiratory:  Negative for apnea, cough, choking, chest tightness, shortness of breath, wheezing and stridor.    Cardiovascular:  Negative for chest pain, palpitations and leg swelling.   Gastrointestinal:  Negative for abdominal distention, abdominal pain, anal bleeding, blood in stool, constipation, diarrhea, nausea, rectal pain and vomiting.   Endocrine: Negative for cold intolerance, heat intolerance, polydipsia, polyphagia and polyuria.   Genitourinary:  Negative for decreased urine volume, difficulty urinating, dysuria, enuresis, flank pain, frequency, genital sores, hematuria and urgency.   Musculoskeletal:  Positive for arthralgias. Negative for back pain, gait problem, joint swelling, myalgias, neck pain and neck stiffness.   Skin:  Negative for color  "change, pallor, rash and wound.   Allergic/Immunologic: Negative for environmental allergies, food allergies and immunocompromised state.   Neurological:  Negative for dizziness, tremors, seizures, syncope, facial asymmetry, speech difficulty, weakness, light-headedness, numbness and headaches.   Hematological:  Negative for adenopathy. Does not bruise/bleed easily.   Psychiatric/Behavioral:  Negative for agitation, behavioral problems, confusion, decreased concentration, dysphoric mood, hallucinations, self-injury, sleep disturbance and suicidal ideas. The patient is not nervous/anxious and is not hyperactive.         Objective      Physical Exam  /92   Ht 188 cm (74.02\")   Wt 117 kg (259 lb)   BMI 33.24 kg/m²     Body mass index is 33.24 kg/m².         General:   Mental Status:  Alert   Appearance: Cooperative, in no acute distress   Build and Nutrition: Well-nourished well-developed male   Orientation: Alert and oriented to person, place and time   Posture: Normal   Gait: Normal/nonantalgic    Integument:   Right knee: Wound is well-healed with no signs of infection    Lower Extremities:   Right Knee:    Tenderness:  None    Effusion:  Trace    Swelling: None    Crepitus:  None    Range of motion:  Extension: 0°       Flexion: 130°  Instability:  No varus laxity, no valgus laxity, negative anterior drawer  Deformities:  None        Imaging/Studies  Imaging Results (Last 24 Hours)       Procedure Component Value Units Date/Time    XR Knee 3+ View With [place] Right [[id number]] Resulted: 03/10/25 0924     Updated: 03/10/25 0924    Narrative:      Right Knee Radiographs  Indication: status-post right total knee arthroplasty  Views: AP, lateral, and sunrise views of the right knee    Comparison: no change compared to prior study, 9/25/2024    Findings:   The components are well aligned, with no signs of loosening or failure.                 Assessment and Plan     Diagnoses and all orders for this " visit:    1. S/P total knee arthroplasty, right (Primary)  -     XR Knee 3+ View With [place] Right    2. Postoperative examination        1. S/P total knee arthroplasty, right    2. Postoperative examination        I reviewed my findings with the patient.  His right total knee arthroplasty is functioning well and he is pleased with results.  I will see him back in 6 months for what will be a 1 year checkup with x-rays.  I will see him back sooner for any problems.    Return in about 6 months (around 9/10/2025) for recheck with x-rays.      Jarek Reid MD  03/10/25  09:38 EDT    Dictated Utilizing Dragon Dictation